# Patient Record
Sex: MALE | Race: WHITE
[De-identification: names, ages, dates, MRNs, and addresses within clinical notes are randomized per-mention and may not be internally consistent; named-entity substitution may affect disease eponyms.]

---

## 2019-10-28 ENCOUNTER — HOSPITAL ENCOUNTER (OUTPATIENT)
Dept: HOSPITAL 84 - D.OPS | Age: 84
Discharge: HOME | End: 2019-10-28
Attending: INTERNAL MEDICINE
Payer: MEDICARE

## 2019-10-28 VITALS — WEIGHT: 206.43 LBS | BODY MASS INDEX: 27.96 KG/M2 | HEIGHT: 72 IN | BODY MASS INDEX: 27.96 KG/M2

## 2019-10-28 DIAGNOSIS — K76.6: Primary | ICD-10-CM

## 2019-10-28 DIAGNOSIS — K74.60: ICD-10-CM

## 2019-10-28 DIAGNOSIS — B19.20: ICD-10-CM

## 2019-10-28 DIAGNOSIS — I85.00: ICD-10-CM

## 2019-10-28 DIAGNOSIS — K31.89: ICD-10-CM

## 2019-10-28 LAB
ANION GAP SERPL CALC-SCNC: 8.8 MMOL/L (ref 8–16)
APTT BLD: 32.6 SECONDS (ref 22.8–39.4)
BASOPHILS NFR BLD AUTO: 0.6 % (ref 0–2)
BUN SERPL-MCNC: 23 MG/DL (ref 7–18)
CALCIUM SERPL-MCNC: 8.8 MG/DL (ref 8.5–10.1)
CHLORIDE SERPL-SCNC: 104 MMOL/L (ref 98–107)
CO2 SERPL-SCNC: 30.6 MMOL/L (ref 21–32)
CREAT SERPL-MCNC: 1 MG/DL (ref 0.6–1.3)
EOSINOPHIL NFR BLD: 1.3 % (ref 0–7)
ERYTHROCYTE [DISTWIDTH] IN BLOOD BY AUTOMATED COUNT: 15.9 % (ref 11.5–14.5)
GLUCOSE SERPL-MCNC: 94 MG/DL (ref 74–106)
HCT VFR BLD CALC: 41 % (ref 42–54)
HGB BLD-MCNC: 14.1 G/DL (ref 13.5–17.5)
IMM GRANULOCYTES NFR BLD: 0.2 % (ref 0–5)
INR PPP: 1.12 (ref 0.85–1.17)
LYMPHOCYTES NFR BLD AUTO: 31.6 % (ref 15–50)
MCH RBC QN AUTO: 32.6 PG (ref 26–34)
MCHC RBC AUTO-ENTMCNC: 34.4 G/DL (ref 31–37)
MCV RBC: 94.9 FL (ref 80–100)
MONOCYTES NFR BLD: 18.4 % (ref 2–11)
NEUTROPHILS NFR BLD AUTO: 47.9 % (ref 40–80)
OSMOLALITY SERPL CALC.SUM OF ELEC: 281 MOSM/KG (ref 275–300)
PLATELET # BLD: 77 10X3/UL (ref 130–400)
PMV BLD AUTO: 9 FL (ref 7.4–10.4)
POTASSIUM SERPL-SCNC: 4.4 MMOL/L (ref 3.5–5.1)
PROTHROMBIN TIME: 13.9 SECONDS (ref 11.6–15)
RBC # BLD AUTO: 4.32 10X6/UL (ref 4.2–6.1)
SODIUM SERPL-SCNC: 139 MMOL/L (ref 136–145)
WBC # BLD AUTO: 4.7 10X3/UL (ref 4.8–10.8)

## 2019-11-06 NOTE — OP
PATIENT NAME:  JESSA NAM                          MEDICAL RECORD: [id number]
:37                                             LOCATION:D.OPS          
                                                         ADMISSION DATE:        
SURGEON:  JAYME KEITA DO             
 
 
DATE OF OPERATION:  10/28/2019
 
PROCEDURE:  EGD without biopsies.
 
INDICATIONS FOR PROCEDURE:  Gas and bloating, belching symptoms, chronic
hepatitis C with cirrhosis.
 
SCOPE:  Olympus video gastroscope.
 
MEDICATIONS:  Propofol 80 mg IV per anesthesia.
 
ESTIMATED BLOOD LOSS:  None.
 
COMPLICATIONS:  None.
 
FINDINGS:  Informed consent was given.  The patient was made comfortable with
the above medication.  After reaching an adequate level of sedation by slow IV
push, the patient was placed on his left side.  The endoscope was advanced under
direct visualization through the mouth to the second portion of the duodenum
with ease.  There was evidence of grade I esophageal varices in the proximal and
middle esophagus.  There were no bleeding stigmata associated with these varices
and the distal esophagus revealed no varices.  At the GE junction, there were no
major changes consistent with reflux esophagitis.  The endoscope was advanced
beyond the GE junction in the stomach and retroflexed to view the cardia, which
appeared normal.  Throughout the entire stomach, there were very minimal changes
consistent with portal hypertensive gastropathy.  The severity is mild.  No
biopsies were taken on today's examination due to the risk of bleeding with the
patient's low platelet level.  The endoscope was advanced beyond the pylorus
into the duodenum, which appeared normal down to the second portion.  The
endoscope was then withdrawn back to the stomach and esophagus and the
hypopharynx was looked at shortly.  There were no obvious signs of reflux
reaching the airway, although this cannot be entirely excluded based on
appearance alone.  The endoscope was completely withdrawn from the patient.  The
patient tolerated the procedure well and there were no complications.
 
IMPRESSION:
1.  Grade I esophageal varices of the proximal and middle esophagus without
bleeding stigmata.
2.  Mild portal hypertensive gastropathy.
 
PLAN AND RECOMMENDATIONS:
1.  Discharge home when recovery parameters are met.
2.  GERD diet and reflux precautions.
3.  Trial of omeprazole 40 mg daily times 60 days.
4.  Follow up in GI clinic in 1 month to discuss any changes in symptoms.
5.  If no improvement in symptoms, consider gastric emptying scan and PIPIDA
scan to evaluate gallbladder.
6.  Based on the risks versus benefits of repeating EGDs for surveillance of
varices, I do not recommend any repeats or surveillance based on seeing very
small varices involving the proximal mid esophagus only.
 
TRANSINT:[id number] Voice Confirmation ID: [id number] DOCUMENT ID: [id number]
 
 
 
OPERATIVE REPORT                               [id number]    JESSA NAM NATHAN A DO             
 
 
 
Electronically Signed by JAYME QUEEN on 19 at 0736
 
 
 
 
 
 
 
 
 
 
 
 
 
 
 
 
 
 
 
 
 
 
 
 
 
 
 
 
 
 
 
 
 
 
 
 
 
 
 
 
 
CC:                                                             [id number]
DICTATION DATE: 10/28/19 1119     :     10/28/19 1207      [facility] 
                                                                      10/28/19
[id number] [place] [address]